# Patient Record
Sex: FEMALE | Race: WHITE
[De-identification: names, ages, dates, MRNs, and addresses within clinical notes are randomized per-mention and may not be internally consistent; named-entity substitution may affect disease eponyms.]

---

## 2019-07-09 ENCOUNTER — HOSPITAL ENCOUNTER (EMERGENCY)
Dept: HOSPITAL 47 - EC | Age: 69
Discharge: HOME | End: 2019-07-09
Payer: MEDICARE

## 2019-07-09 VITALS — TEMPERATURE: 98.4 F | SYSTOLIC BLOOD PRESSURE: 138 MMHG | HEART RATE: 66 BPM | DIASTOLIC BLOOD PRESSURE: 81 MMHG

## 2019-07-09 VITALS — RESPIRATION RATE: 18 BRPM

## 2019-07-09 DIAGNOSIS — G51.0: Primary | ICD-10-CM

## 2019-07-09 DIAGNOSIS — Z90.49: ICD-10-CM

## 2019-07-09 DIAGNOSIS — F32.9: ICD-10-CM

## 2019-07-09 DIAGNOSIS — Z79.899: ICD-10-CM

## 2019-07-09 DIAGNOSIS — Z90.710: ICD-10-CM

## 2019-07-09 DIAGNOSIS — F17.210: ICD-10-CM

## 2019-07-09 DIAGNOSIS — Z88.5: ICD-10-CM

## 2019-07-09 LAB
ALBUMIN SERPL-MCNC: 4.1 G/DL (ref 3.5–5)
ALP SERPL-CCNC: 67 U/L (ref 38–126)
ALT SERPL-CCNC: 26 U/L (ref 9–52)
ANION GAP SERPL CALC-SCNC: 10 MMOL/L
APTT BLD: 23.4 SEC (ref 22–30)
AST SERPL-CCNC: 22 U/L (ref 14–36)
BASOPHILS # BLD AUTO: 0 K/UL (ref 0–0.2)
BASOPHILS NFR BLD AUTO: 1 %
BUN SERPL-SCNC: 16 MG/DL (ref 7–17)
CALCIUM SPEC-MCNC: 9.5 MG/DL (ref 8.4–10.2)
CHLORIDE SERPL-SCNC: 110 MMOL/L (ref 98–107)
CO2 SERPL-SCNC: 22 MMOL/L (ref 22–30)
EOSINOPHIL # BLD AUTO: 0.2 K/UL (ref 0–0.7)
EOSINOPHIL NFR BLD AUTO: 4 %
ERYTHROCYTE [DISTWIDTH] IN BLOOD BY AUTOMATED COUNT: 4.56 M/UL (ref 3.8–5.4)
ERYTHROCYTE [DISTWIDTH] IN BLOOD: 13.7 % (ref 11.5–15.5)
GLUCOSE SERPL-MCNC: 107 MG/DL (ref 74–99)
HCT VFR BLD AUTO: 42.2 % (ref 34–46)
HGB BLD-MCNC: 14.1 GM/DL (ref 11.4–16)
INR PPP: 1 (ref ?–1.2)
LYMPHOCYTES # SPEC AUTO: 1.7 K/UL (ref 1–4.8)
LYMPHOCYTES NFR SPEC AUTO: 29 %
MCH RBC QN AUTO: 30.9 PG (ref 25–35)
MCHC RBC AUTO-ENTMCNC: 33.3 G/DL (ref 31–37)
MCV RBC AUTO: 92.6 FL (ref 80–100)
MONOCYTES # BLD AUTO: 0.3 K/UL (ref 0–1)
MONOCYTES NFR BLD AUTO: 5 %
NEUTROPHILS # BLD AUTO: 3.6 K/UL (ref 1.3–7.7)
NEUTROPHILS NFR BLD AUTO: 60 %
PLATELET # BLD AUTO: 220 K/UL (ref 150–450)
POTASSIUM SERPL-SCNC: 4.3 MMOL/L (ref 3.5–5.1)
PROT SERPL-MCNC: 6.8 G/DL (ref 6.3–8.2)
PT BLD: 10.3 SEC (ref 9–12)
SODIUM SERPL-SCNC: 142 MMOL/L (ref 137–145)
WBC # BLD AUTO: 6 K/UL (ref 3.8–10.6)

## 2019-07-09 PROCEDURE — 85610 PROTHROMBIN TIME: CPT

## 2019-07-09 PROCEDURE — 85025 COMPLETE CBC W/AUTO DIFF WBC: CPT

## 2019-07-09 PROCEDURE — 70498 CT ANGIOGRAPHY NECK: CPT

## 2019-07-09 PROCEDURE — 71046 X-RAY EXAM CHEST 2 VIEWS: CPT

## 2019-07-09 PROCEDURE — 80053 COMPREHEN METABOLIC PANEL: CPT

## 2019-07-09 PROCEDURE — 93005 ELECTROCARDIOGRAM TRACING: CPT

## 2019-07-09 PROCEDURE — 85730 THROMBOPLASTIN TIME PARTIAL: CPT

## 2019-07-09 PROCEDURE — 70450 CT HEAD/BRAIN W/O DYE: CPT

## 2019-07-09 PROCEDURE — 99285 EMERGENCY DEPT VISIT HI MDM: CPT

## 2019-07-09 PROCEDURE — 84484 ASSAY OF TROPONIN QUANT: CPT

## 2019-07-09 PROCEDURE — 70496 CT ANGIOGRAPHY HEAD: CPT

## 2019-07-09 PROCEDURE — 36415 COLL VENOUS BLD VENIPUNCTURE: CPT

## 2019-07-09 NOTE — ED
General Adult HPI





- General


Chief complaint: Neuro Symptoms/Deficit


Stated complaint: Poss CVA


Time Seen by Provider: 19 09:30


Source: patient, RN notes reviewed


Mode of arrival: EMS


Limitations: no limitations





- History of Present Illness


Initial comments: 





This is a 69-year-old female who presents to the emergency department 

complaining that she has facial droop on the right side since last night at 

6:00.  Patient states she noticed that when she went to smoke a cigarette she 

could hold significant amount.  Patient didn't think much of it and went to bed 

when she woke up this morning her daughter told she needed with emergency 

department.  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

she is not exactly sure about the details but she has had no intervention for.  

Patient denies any headache today patient denies any numbness or extremity 

weakness.  Patient denies any visual disturbance.  Patient states she is as c

oordinated as she normally is.  Patient states she's able to ambulate normally 

as well.  Patient denies any recent fever chills or cough per patient denies any

difficulty breathing or shortness of breath per patient denies any chest pain.  

Patient denies abdominal pain.





- Related Data


                                Home Medications











 Medication  Instructions  Recorded  Confirmed


 


Diazepam [Valium] 2 mg PO BID PRN 19


 


HYDROcodone/APAP 7.5-325MG [Norco 1 tab PO DAILY PRN 19





7.5-325]   


 


Venlafaxine HCl [Effexor XR] 75 mg PO HS 19








                                  Previous Rx's











 Medication  Instructions  Recorded


 


predniSONE 25 mg PO DAILY 10 Days #5 tab 19


 


predniSONE 30 mg PO BID 5 Days #10 tab 19


 


valACYclovir HCL [Valacyclovir] 1,000 mg PO TID 10 Days #30 tab 19











                                    Allergies











Allergy/AdvReac Type Severity Reaction Status Date / Time


 


morphine AdvReac  Nausea & Verified 19 11:12





   Vomiting  














Review of Systems


ROS Statement: 


Those systems with pertinent positive or pertinent negative responses have been 

documented in the HPI.





ROS Other: All systems not noted in ROS Statement are negative.





Past Medical History


Past Medical History: No Reported History


History of Any Multi-Drug Resistant Organisms: None Reported


Past Surgical History:  Section, Cholecystectomy, Hysterectomy


Past Psychological History: Depression


Smoking Status: Current every day smoker


Past Alcohol Use History: None Reported


Past Drug Use History: Marijuana





General Exam





- General Exam Comments


Initial Comments: 





GENERAL:


Patient is well-developed and well-nourished.  Patient is nontoxic and well-

hydrated and is in no acute distress.





ENT:


Neck is soft and supple.  No significant lymphadenopathy is noted.  Oropharynx 

is clear.  Moist mucous membranes.  Neck has full range of motion without 

eliciting any pain.  





EYES:


The sclera were anicteric and conjunctiva were pink and moist.  Extraocular 

movements were intact and pupils were equal round and reactive to light.  E

yelids were unremarkable.





PULMONARY:


Unlabored respirations.  Good breath sounds bilaterally.  No audible rales 

rhonchi or wheezing was noted.





CARDIOVASCULAR:


There is a regular rate and rhythm without any murmurs gallops or rubs.  





ABDOMEN:


Soft and nontender with normal bowel sounds.  





SKIN:


Skin is clear with no lesions or rashes and otherwise unremarkable.





NEUROLOGIC:


Patient is alert and oriented x3.  Patient has complete right-sided facial droop

 including the forehead.  Motor and sensory are also intact.  Normal speech, 

volume and content.  Symmetrical smile.  Cerebellar exam grossly intact.





MUSCULOSKELETAL:


Normal extremities with adequate strength and full range of motion.  





LYMPHATICS:


No significant lymphadenopathy is noted





PSYCHIATRIC:


Normal psychiatric evaluation.  


Limitations: no limitations





Course


                                   Vital Signs











  19





  09:34


 


Temperature 98.6 F


 


Pulse Rate 89


 


Respiratory 18





Rate 


 


Blood Pressure 136/92


 


O2 Sat by Pulse 97





Oximetry 














Medical Decision Making





- Medical Decision Making





EKG shows normal sinus rhythm at 66 bpm DE interval is 130 QRS is 94 QT interval

 38 QTC is 406.  Patient's EKG shows no ST segment elevation or depression or T 

wave abnormalities are noted.





Chest x-ray shows no acute normalities.  CTA shows no acute abnormality.





Patient's forehead is completely involved on the right oblique patient has Be

ll's palsy.  I will treat her as such.





- Lab Data


Result diagrams: 


                                 19 09:40





                                 19 09:40


                                   Lab Results











  19 Range/Units





  09:40 09:40 09:40 


 


WBC  6.0    (3.8-10.6)  k/uL


 


RBC  4.56    (3.80-5.40)  m/uL


 


Hgb  14.1    (11.4-16.0)  gm/dL


 


Hct  42.2    (34.0-46.0)  %


 


MCV  92.6    (80.0-100.0)  fL


 


MCH  30.9    (25.0-35.0)  pg


 


MCHC  33.3    (31.0-37.0)  g/dL


 


RDW  13.7    (11.5-15.5)  %


 


Plt Count  220    (150-450)  k/uL


 


Neutrophils %  60    %


 


Lymphocytes %  29    %


 


Monocytes %  5    %


 


Eosinophils %  4    %


 


Basophils %  1    %


 


Neutrophils #  3.6    (1.3-7.7)  k/uL


 


Lymphocytes #  1.7    (1.0-4.8)  k/uL


 


Monocytes #  0.3    (0-1.0)  k/uL


 


Eosinophils #  0.2    (0-0.7)  k/uL


 


Basophils #  0.0    (0-0.2)  k/uL


 


PT    10.3  (9.0-12.0)  sec


 


INR    1.0  (<1.2)  


 


APTT    23.4  (22.0-30.0)  sec


 


Sodium   142   (137-145)  mmol/L


 


Potassium   4.3   (3.5-5.1)  mmol/L


 


Chloride   110 H   ()  mmol/L


 


Carbon Dioxide   22   (22-30)  mmol/L


 


Anion Gap   10   mmol/L


 


BUN   16   (7-17)  mg/dL


 


Creatinine   0.67   (0.52-1.04)  mg/dL


 


Est GFR (CKD-EPI)AfAm   >90   (>60 ml/min/1.73 sqM)  


 


Est GFR (CKD-EPI)NonAf   >90   (>60 ml/min/1.73 sqM)  


 


Glucose   107 H   (74-99)  mg/dL


 


Calcium   9.5   (8.4-10.2)  mg/dL


 


Total Bilirubin   0.4   (0.2-1.3)  mg/dL


 


AST   22   (14-36)  U/L


 


ALT   26   (9-52)  U/L


 


Alkaline Phosphatase   67   ()  U/L


 


Troponin I     (0.000-0.034)  ng/mL


 


Total Protein   6.8   (6.3-8.2)  g/dL


 


Albumin   4.1   (3.5-5.0)  g/dL














  19 Range/Units





  09:40 


 


WBC   (3.8-10.6)  k/uL


 


RBC   (3.80-5.40)  m/uL


 


Hgb   (11.4-16.0)  gm/dL


 


Hct   (34.0-46.0)  %


 


MCV   (80.0-100.0)  fL


 


MCH   (25.0-35.0)  pg


 


MCHC   (31.0-37.0)  g/dL


 


RDW   (11.5-15.5)  %


 


Plt Count   (150-450)  k/uL


 


Neutrophils %   %


 


Lymphocytes %   %


 


Monocytes %   %


 


Eosinophils %   %


 


Basophils %   %


 


Neutrophils #   (1.3-7.7)  k/uL


 


Lymphocytes #   (1.0-4.8)  k/uL


 


Monocytes #   (0-1.0)  k/uL


 


Eosinophils #   (0-0.7)  k/uL


 


Basophils #   (0-0.2)  k/uL


 


PT   (9.0-12.0)  sec


 


INR   (<1.2)  


 


APTT   (22.0-30.0)  sec


 


Sodium   (137-145)  mmol/L


 


Potassium   (3.5-5.1)  mmol/L


 


Chloride   ()  mmol/L


 


Carbon Dioxide   (22-30)  mmol/L


 


Anion Gap   mmol/L


 


BUN   (7-17)  mg/dL


 


Creatinine   (0.52-1.04)  mg/dL


 


Est GFR (CKD-EPI)AfAm   (>60 ml/min/1.73 sqM)  


 


Est GFR (CKD-EPI)NonAf   (>60 ml/min/1.73 sqM)  


 


Glucose   (74-99)  mg/dL


 


Calcium   (8.4-10.2)  mg/dL


 


Total Bilirubin   (0.2-1.3)  mg/dL


 


AST   (14-36)  U/L


 


ALT   (9-52)  U/L


 


Alkaline Phosphatase   ()  U/L


 


Troponin I  <0.012  (0.000-0.034)  ng/mL


 


Total Protein   (6.3-8.2)  g/dL


 


Albumin   (3.5-5.0)  g/dL














Disposition


Clinical Impression: 


 Bell's palsy





Disposition: HOME SELF-CARE


Condition: Good


Prescriptions: 


predniSONE 30 mg PO BID 5 Days #10 tab


predniSONE 25 mg PO DAILY 10 Days #5 tab


valACYclovir HCL [Valacyclovir] 1,000 mg PO TID 10 Days #30 tab


Is patient prescribed a controlled substance at d/c from ED?: No


Referrals: 


Nonstaff,Physician [Primary Care Provider] - 1-2 days


Time of Disposition: 11:52

## 2019-07-09 NOTE — CT
EXAMINATION TYPE: CT angio head neck

 

DATE OF EXAM: 7/9/2019

 

COMPARISON: CT brain same day

 

HISTORY: 69-year-old female Neuro deficits since 1800 last night

 

TECHNIQUE: Contiguous axial scanning of the head and neck performed with IV Contrast, patient injecte
d with 50 ml mL of Isovue 370.

 

Sagittal MIP reconstructions performed.

 

CT DLP: 477.8 mGycm

Automated exposure control for dose reduction was used.

 

FINDINGS: 

Neck:

Some prominent borderline enlarged mediastinal lymph nodes measuring up to 7 mm right paratracheal, 1
 cm left tracheobronchial angle, and 7 mm AP window. Ectatic upper descending thoracic aorta at 3.0 c
m. Conventional arch vessel branching anatomy.

 

Bilateral vertebral artery origins are patent. The vessels are patent throughout the course of the le
ft vertebral artery being slightly more dominant.

 

Right common and internal carotid arteries are widely patent.

 

Left common and internal carotid arteries are widely patent.

 

 

Head:

The vertebral and basilar arteries are patent.

 

There is unusual course of the supraclinoid left internal carotid artery with a straight superior cou
rse. As a result, the left carotid terminus is positioned more superiorly and anteriorly. There is mi
ld fusiform dilatation of the supraclinoid segment of 5 mm versus 3 mm on the contralateral side.

 

No significant stenosis or aneurysmal change is seen. No large vessel arterial occlusion.

 

 

IMPRESSION: 

HEAD:

1. Unusual course of the supraclinoid left internal carotid artery with a straight superior course. A
s a result, the left carotid terminus is located more anteriorly and superiorly. This likely accounts
 for the M1 segment left MCA asymmetry seen on the noncontrast head CT. No large vessel intracranial 
arterial occlusion or significant stenosis

2. The supraclinoid left ICA shows mild fusiform dilatation of 5 mm versus 3 mm on the contralateral 
side.

 

 

NECK:

1. The carotid and vertebral arteries of the neck are widely patent.

2.  Borderline enlarged mediastinal lymph nodes may be reactive/post inflammatory. Follow-up CT chest
 as clinically indicated.

## 2019-07-09 NOTE — XR
EXAMINATION TYPE: XR chest 2V

 

DATE OF EXAM: 7/9/2019

 

COMPARISON: NONE

 

HISTORY: Altered mental status

 

TECHNIQUE:  Frontal and lateral views of the chest are obtained.

 

FINDINGS:  There is no focal air space opacity, pleural effusion, or pneumothorax seen.  The cardiac 
silhouette size is within normal limits.  Minimal degenerative changes of the acromio clavicular join
ts and thoracic spine. The osseous structures are intact. Cholecystectomy clips are seen.

 

IMPRESSION:  No acute cardiopulmonary process.

## 2019-07-09 NOTE — CT
EXAMINATION TYPE: CT brain wo con

 

DATE OF EXAM: 7/9/2019

 

COMPARISON: None

 

HISTORY: Possible CVA; Neurodeficits.  Right sided facial droop since 1800 last night

 

CT DLP: 1091.4 mGycm

Automated exposure control for dose reduction was used.

 

FINDINGS: 

Mild generalized degenerative change. Faint low-attenuation in the white matter. Nonspecific most typ
ical remote microvascular ischemia.

 

There is intracranial atherosclerotic changes. On image 19 axial sequence there appears to be increas
ed density in the distribution the left MCA. Acute thrombosis not excluded. Case discussed with ER ph
ysician. 

 

 

IMPRESSION:

1. M1 segment left MCA is slightly hyperdense relative to the right. This raises concern for thrombos
is of the left MCA. Case discussed with ER physician.

2. Degenerative and nonspecific white matter changes most typical remote microvascular ischemia.

3. No acute hemorrhage.